# Patient Record
Sex: FEMALE | NOT HISPANIC OR LATINO
[De-identification: names, ages, dates, MRNs, and addresses within clinical notes are randomized per-mention and may not be internally consistent; named-entity substitution may affect disease eponyms.]

---

## 2017-10-02 ENCOUNTER — APPOINTMENT (OUTPATIENT)
Dept: OBGYN | Facility: CLINIC | Age: 52
End: 2017-10-02
Payer: COMMERCIAL

## 2017-10-02 VITALS
HEIGHT: 68 IN | WEIGHT: 180 LBS | SYSTOLIC BLOOD PRESSURE: 129 MMHG | DIASTOLIC BLOOD PRESSURE: 80 MMHG | BODY MASS INDEX: 27.28 KG/M2

## 2017-10-02 PROCEDURE — 82270 OCCULT BLOOD FECES: CPT

## 2017-10-02 PROCEDURE — 99396 PREV VISIT EST AGE 40-64: CPT

## 2017-10-04 ENCOUNTER — MESSAGE (OUTPATIENT)
Age: 52
End: 2017-10-04

## 2017-10-04 LAB — HPV HIGH+LOW RISK DNA PNL CVX: NOT DETECTED

## 2017-10-09 LAB — CYTOLOGY CVX/VAG DOC THIN PREP: NORMAL

## 2018-10-30 ENCOUNTER — OTHER (OUTPATIENT)
Age: 53
End: 2018-10-30

## 2018-11-07 ENCOUNTER — APPOINTMENT (OUTPATIENT)
Dept: OBGYN | Facility: CLINIC | Age: 53
End: 2018-11-07
Payer: COMMERCIAL

## 2018-11-07 VITALS
HEIGHT: 68 IN | SYSTOLIC BLOOD PRESSURE: 138 MMHG | WEIGHT: 182 LBS | BODY MASS INDEX: 27.58 KG/M2 | DIASTOLIC BLOOD PRESSURE: 88 MMHG

## 2018-11-07 PROCEDURE — 99396 PREV VISIT EST AGE 40-64: CPT

## 2018-11-09 ENCOUNTER — MESSAGE (OUTPATIENT)
Age: 53
End: 2018-11-09

## 2018-11-09 LAB — HPV HIGH+LOW RISK DNA PNL CVX: NOT DETECTED

## 2018-11-12 ENCOUNTER — MESSAGE (OUTPATIENT)
Age: 53
End: 2018-11-12

## 2018-11-12 LAB — CYTOLOGY CVX/VAG DOC THIN PREP: NORMAL

## 2019-10-21 ENCOUNTER — RX RENEWAL (OUTPATIENT)
Age: 54
End: 2019-10-21

## 2019-10-21 DIAGNOSIS — R92.2 INCONCLUSIVE MAMMOGRAM: ICD-10-CM

## 2019-11-18 ENCOUNTER — APPOINTMENT (OUTPATIENT)
Dept: OBGYN | Facility: CLINIC | Age: 54
End: 2019-11-18
Payer: COMMERCIAL

## 2019-11-18 VITALS
BODY MASS INDEX: 27.74 KG/M2 | WEIGHT: 183 LBS | HEIGHT: 68 IN | DIASTOLIC BLOOD PRESSURE: 84 MMHG | SYSTOLIC BLOOD PRESSURE: 138 MMHG

## 2019-11-18 PROCEDURE — 99396 PREV VISIT EST AGE 40-64: CPT

## 2019-11-20 ENCOUNTER — MESSAGE (OUTPATIENT)
Age: 54
End: 2019-11-20

## 2019-11-20 LAB — HPV HIGH+LOW RISK DNA PNL CVX: NOT DETECTED

## 2019-11-22 ENCOUNTER — MESSAGE (OUTPATIENT)
Age: 54
End: 2019-11-22

## 2019-11-22 LAB — CYTOLOGY CVX/VAG DOC THIN PREP: ABNORMAL

## 2020-08-28 ENCOUNTER — APPOINTMENT (OUTPATIENT)
Dept: OBGYN | Facility: CLINIC | Age: 55
End: 2020-08-28
Payer: COMMERCIAL

## 2020-08-28 VITALS
SYSTOLIC BLOOD PRESSURE: 130 MMHG | DIASTOLIC BLOOD PRESSURE: 84 MMHG | BODY MASS INDEX: 26.52 KG/M2 | WEIGHT: 175 LBS | HEIGHT: 68 IN

## 2020-08-28 DIAGNOSIS — N89.8 OTHER SPECIFIED NONINFLAMMATORY DISORDERS OF VAGINA: ICD-10-CM

## 2020-08-28 PROCEDURE — 99213 OFFICE O/P EST LOW 20 MIN: CPT

## 2020-08-28 NOTE — PHYSICAL EXAM
[Normal] : urethra [Labia Majora] : labia major [Labia Minora] : labia minora [FreeTextEntry4] : scant discharge Affirm done

## 2020-08-31 LAB
CANDIDA VAG CYTO: NOT DETECTED
G VAGINALIS+PREV SP MTYP VAG QL MICRO: NOT DETECTED
T VAGINALIS VAG QL WET PREP: NOT DETECTED

## 2020-12-21 ENCOUNTER — APPOINTMENT (OUTPATIENT)
Dept: OBGYN | Facility: CLINIC | Age: 55
End: 2020-12-21
Payer: COMMERCIAL

## 2020-12-21 VITALS
SYSTOLIC BLOOD PRESSURE: 123 MMHG | DIASTOLIC BLOOD PRESSURE: 80 MMHG | HEIGHT: 68 IN | BODY MASS INDEX: 24.55 KG/M2 | WEIGHT: 162 LBS

## 2020-12-21 PROCEDURE — 99396 PREV VISIT EST AGE 40-64: CPT

## 2020-12-21 PROCEDURE — 99072 ADDL SUPL MATRL&STAF TM PHE: CPT

## 2020-12-21 NOTE — HISTORY OF PRESENT ILLNESS
[FreeTextEntry1] : 54yo,  with gyn history significant for:\par 1.Fibroids\par 2.Dysmenorrhea\par LMP 2017

## 2020-12-21 NOTE — PLAN
[FreeTextEntry1] : 56yo without gyn complaint\par Plan:\par 1.PAP\par 2.Mammo\par 3.Colonosocpy within the year

## 2020-12-23 ENCOUNTER — MESSAGE (OUTPATIENT)
Age: 55
End: 2020-12-23

## 2020-12-23 ENCOUNTER — NON-APPOINTMENT (OUTPATIENT)
Age: 55
End: 2020-12-23

## 2020-12-23 LAB
CYTOLOGY CVX/VAG DOC THIN PREP: ABNORMAL
HPV HIGH+LOW RISK DNA PNL CVX: NOT DETECTED

## 2020-12-24 ENCOUNTER — TRANSCRIPTION ENCOUNTER (OUTPATIENT)
Age: 55
End: 2020-12-24

## 2021-04-18 ENCOUNTER — TRANSCRIPTION ENCOUNTER (OUTPATIENT)
Age: 56
End: 2021-04-18

## 2022-01-03 ENCOUNTER — APPOINTMENT (OUTPATIENT)
Dept: OBGYN | Facility: CLINIC | Age: 57
End: 2022-01-03
Payer: COMMERCIAL

## 2022-01-03 VITALS
WEIGHT: 177 LBS | DIASTOLIC BLOOD PRESSURE: 82 MMHG | SYSTOLIC BLOOD PRESSURE: 140 MMHG | HEIGHT: 68 IN | BODY MASS INDEX: 26.83 KG/M2

## 2022-01-03 PROCEDURE — 82270 OCCULT BLOOD FECES: CPT

## 2022-01-03 PROCEDURE — 99396 PREV VISIT EST AGE 40-64: CPT

## 2022-01-03 NOTE — DISCUSSION/SUMMARY
[FreeTextEntry1] : 55yo with vaginal dryness and dyspareunia\par Plan:\par 1.Replens;silicon based lubricant\par 2.PAP\par 3.Mammo\par 4.TVS\par 5.Colonosopcy

## 2022-01-03 NOTE — HISTORY OF PRESENT ILLNESS
[FreeTextEntry1] : 57yo,  with gyn history significant for:\par 1.Fibroids\par 2.Bladder ca:sees \par LMP 2017

## 2022-01-05 ENCOUNTER — MESSAGE (OUTPATIENT)
Age: 57
End: 2022-01-05

## 2022-01-05 LAB — HPV HIGH+LOW RISK DNA PNL CVX: NOT DETECTED

## 2022-01-07 ENCOUNTER — MESSAGE (OUTPATIENT)
Age: 57
End: 2022-01-07

## 2022-01-07 LAB — CYTOLOGY CVX/VAG DOC THIN PREP: ABNORMAL

## 2022-02-23 ENCOUNTER — NON-APPOINTMENT (OUTPATIENT)
Age: 57
End: 2022-02-23

## 2022-02-24 ENCOUNTER — NON-APPOINTMENT (OUTPATIENT)
Age: 57
End: 2022-02-24

## 2022-06-03 ENCOUNTER — APPOINTMENT (OUTPATIENT)
Dept: ORTHOPEDIC SURGERY | Facility: CLINIC | Age: 57
End: 2022-06-03

## 2022-10-05 ENCOUNTER — NON-APPOINTMENT (OUTPATIENT)
Age: 57
End: 2022-10-05

## 2023-01-11 ENCOUNTER — APPOINTMENT (OUTPATIENT)
Dept: OBGYN | Facility: CLINIC | Age: 58
End: 2023-01-11
Payer: COMMERCIAL

## 2023-01-11 VITALS
WEIGHT: 192.8 LBS | HEIGHT: 68 IN | BODY MASS INDEX: 29.22 KG/M2 | DIASTOLIC BLOOD PRESSURE: 84 MMHG | SYSTOLIC BLOOD PRESSURE: 126 MMHG

## 2023-01-11 VITALS — HEIGHT: 68 IN

## 2023-01-11 DIAGNOSIS — D25.9 LEIOMYOMA OF UTERUS, UNSPECIFIED: ICD-10-CM

## 2023-01-11 PROCEDURE — 99396 PREV VISIT EST AGE 40-64: CPT

## 2023-01-11 PROCEDURE — 82270 OCCULT BLOOD FECES: CPT

## 2023-01-11 NOTE — PLAN
[FreeTextEntry1] : Examination was done.  Patient was given prescription for mammogram bone density and transvaginal sonogram.\par Patient recommended to see dermatologist for mole on the left breast

## 2023-02-13 ENCOUNTER — APPOINTMENT (OUTPATIENT)
Dept: ORTHOPEDIC SURGERY | Facility: CLINIC | Age: 58
End: 2023-02-13
Payer: COMMERCIAL

## 2023-02-13 VITALS — WEIGHT: 192 LBS | HEIGHT: 68 IN | BODY MASS INDEX: 29.1 KG/M2

## 2023-02-13 DIAGNOSIS — G89.29 LOW BACK PAIN, UNSPECIFIED: ICD-10-CM

## 2023-02-13 DIAGNOSIS — M54.50 LOW BACK PAIN, UNSPECIFIED: ICD-10-CM

## 2023-02-13 PROCEDURE — 99214 OFFICE O/P EST MOD 30 MIN: CPT

## 2023-02-13 PROCEDURE — 73503 X-RAY EXAM HIP UNI 4/> VIEWS: CPT | Mod: RT

## 2023-02-13 NOTE — IMAGING
[de-identified] : Right hip: No swelling, no tenderness.  Loss of IR - 0.  Groin pain with IR.  Pos impingement.\par \par Left hip: Right hip: No swelling, no tenderness.  Mild loss of IR - 10.  Groin pain with IR.  Pos impingement.

## 2023-02-13 NOTE — HISTORY OF PRESENT ILLNESS
[Gradual] : gradual [6] : 6 [5] : 5 [Dull/Aching] : dull/aching [Tightness] : tightness [Constant] : constant [] : no [FreeTextEntry5] : pt is 57 years old female who present evaluation of the analisa hip pt states she thinks she has arthritis no  injury or trauma reported

## 2023-02-13 NOTE — DISCUSSION/SUMMARY
[de-identified] : The natural progression of Osteoarthritis was explained to the patient.  We discussed the possible treatment options from conservative to operative.  These included NSAIDS, Glucosamine and Chondrotin sulfate, and Physical Therapy as well different types of injections.  We also discussed that at some point they may progress to needed a GREG.  Information and pamphlets were given when approprate.\par \par We discussed hip injection and its diagnostic and therapeutic benefits.  In the operating room I will inject a combination of Depomedrol and Marcaine.  The patient will keep a log of their pain relief after the injection.  We will discuss the benefit at a one to two weeks follow-up.  Patient made aware that they may be referred to pain management for this injection\par

## 2023-02-13 NOTE — ASSESSMENT
[FreeTextEntry1] : Mod OA both hips - more in right than left with some progression seen.  DIscussed optiosn - will plan for right hip inj and PT for hips and back stiffness.

## 2023-03-08 ENCOUNTER — LABORATORY RESULT (OUTPATIENT)
Age: 58
End: 2023-03-08

## 2023-03-09 ENCOUNTER — APPOINTMENT (OUTPATIENT)
Age: 58
End: 2023-03-09
Payer: COMMERCIAL

## 2023-03-09 PROCEDURE — 27095 INJECTION FOR HIP X-RAY: CPT | Mod: RT

## 2023-03-09 PROCEDURE — 73525 CONTRAST X-RAY OF HIP: CPT | Mod: 26,59

## 2023-03-28 ENCOUNTER — TRANSCRIPTION ENCOUNTER (OUTPATIENT)
Age: 58
End: 2023-03-28

## 2023-03-28 ENCOUNTER — APPOINTMENT (OUTPATIENT)
Dept: ORTHOPEDIC SURGERY | Facility: CLINIC | Age: 58
End: 2023-03-28
Payer: COMMERCIAL

## 2023-03-28 VITALS — HEIGHT: 68 IN | WEIGHT: 192 LBS | BODY MASS INDEX: 29.1 KG/M2

## 2023-03-28 PROCEDURE — 99213 OFFICE O/P EST LOW 20 MIN: CPT

## 2023-03-28 NOTE — DISCUSSION/SUMMARY
[de-identified] : The natural progression of Osteoarthritis was explained to the patient.  We discussed the possible treatment options from conservative to operative.  These included NSAIDS, Glucosamine and Chondrotin sulfate, and Physical Therapy as well different types of injections.  We also discussed that at some point they may progress to needed a GREG.  Information and pamphlets were given when approprate.\par \par Entered by Shira Flanagan acting as scribe.

## 2023-03-28 NOTE — HISTORY OF PRESENT ILLNESS
[0] : 0 [de-identified] : 3/28/23: Significant improvement from right hip injection. At this point, she has minimal pain, only mild stiffness mostly during activity. She is going to PT with good relief. \par \par Prev doc:\par 2/13/23:  [FreeTextEntry1] : B/L Hip  [FreeTextEntry5] : Linda is a 57 year F here for B/L Hip. States there doing better. STates pt is helping and the injections.

## 2023-03-28 NOTE — ASSESSMENT
[FreeTextEntry1] : Mod OA both hips - more in right than left with some progression seen.  DIscussed optiosn - will plan for right hip inj and PT for hips and back stiffness.\par \par 3/28/23: Significant relief from right IA hip injection and PT. At this point, she was minimal pain. Advised to continue with HEP, anti-inflammatories as needed and maintain a healthy lifestyle with good nutrition and low impact exercises. Follow up prn.

## 2023-03-28 NOTE — IMAGING
[de-identified] : Right hip: No swelling, no tenderness.  Loss of IR - 0.  Groin pain with IR.  Pos impingement.\par \par Left hip: Right hip: No swelling, no tenderness.  Mild loss of IR - 10.  Groin pain with IR.  Pos impingement.

## 2023-06-02 ENCOUNTER — NON-APPOINTMENT (OUTPATIENT)
Age: 58
End: 2023-06-02

## 2023-06-05 ENCOUNTER — NON-APPOINTMENT (OUTPATIENT)
Age: 58
End: 2023-06-05

## 2023-12-30 ENCOUNTER — NON-APPOINTMENT (OUTPATIENT)
Age: 58
End: 2023-12-30

## 2024-03-21 ENCOUNTER — APPOINTMENT (OUTPATIENT)
Dept: OBGYN | Facility: CLINIC | Age: 59
End: 2024-03-21
Payer: COMMERCIAL

## 2024-03-21 VITALS
DIASTOLIC BLOOD PRESSURE: 88 MMHG | SYSTOLIC BLOOD PRESSURE: 134 MMHG | HEIGHT: 68 IN | BODY MASS INDEX: 28.64 KG/M2 | WEIGHT: 189 LBS

## 2024-03-21 DIAGNOSIS — Z01.419 ENCOUNTER FOR GYNECOLOGICAL EXAMINATION (GENERAL) (ROUTINE) W/OUT ABNORMAL FINDINGS: ICD-10-CM

## 2024-03-21 DIAGNOSIS — Z12.12 ENCOUNTER FOR SCREENING FOR MALIGNANT NEOPLASM OF COLON: ICD-10-CM

## 2024-03-21 DIAGNOSIS — Z12.11 ENCOUNTER FOR SCREENING FOR MALIGNANT NEOPLASM OF COLON: ICD-10-CM

## 2024-03-21 PROCEDURE — 99396 PREV VISIT EST AGE 40-64: CPT

## 2024-03-21 NOTE — PLAN
[FreeTextEntry1] : Examination Pap smear was done patient was given prescription for bone density mammogram sonogram, Patient was recommended to go for transvaginal sonogram.

## 2024-03-21 NOTE — HISTORY OF PRESENT ILLNESS
[Mammogramdate] : 2023 [FreeTextEntry1] : 58-year-old patient for gynecological exam without any gynecological complaints, Patient has history of fibroid uterus [PapSmeardate] : 2022 [ColonoscopyDate] : 2023 [Menopause Age: ____] : age at menopause was [unfilled] [Currently In Menopause] : currently in menopause

## 2024-03-21 NOTE — PHYSICAL EXAM
[Chaperone Declined] : Patient declined chaperone [Appropriately responsive] : appropriately responsive [Alert] : alert [No Acute Distress] : no acute distress [No Lymphadenopathy] : no lymphadenopathy [Regular Rate Rhythm] : regular rate rhythm [No Murmurs] : no murmurs [Clear to Auscultation B/L] : clear to auscultation bilaterally [Soft] : soft [Non-tender] : non-tender [Non-distended] : non-distended [No HSM] : No HSM [No Lesions] : no lesions [No Mass] : no mass [Examination Of The Breasts] : a normal appearance [Oriented x3] : oriented x3 [No Masses] : no breast masses were palpable [Labia Majora] : normal [Labia Minora] : normal [Uterine Adnexae] : normal [Normal] : normal

## 2024-04-01 LAB
CYTOLOGY CVX/VAG DOC THIN PREP: ABNORMAL
HPV HIGH+LOW RISK DNA PNL CVX: NOT DETECTED

## 2024-05-06 ENCOUNTER — NON-APPOINTMENT (OUTPATIENT)
Age: 59
End: 2024-05-06

## 2024-06-04 ENCOUNTER — APPOINTMENT (OUTPATIENT)
Dept: ORTHOPEDIC SURGERY | Facility: CLINIC | Age: 59
End: 2024-06-04
Payer: COMMERCIAL

## 2024-06-04 VITALS — WEIGHT: 189 LBS | HEIGHT: 68 IN | BODY MASS INDEX: 28.64 KG/M2

## 2024-06-04 DIAGNOSIS — M16.0 BILATERAL PRIMARY OSTEOARTHRITIS OF HIP: ICD-10-CM

## 2024-06-04 PROCEDURE — 73522 X-RAY EXAM HIPS BI 3-4 VIEWS: CPT

## 2024-06-04 PROCEDURE — 99214 OFFICE O/P EST MOD 30 MIN: CPT

## 2024-06-04 RX ORDER — MELOXICAM 15 MG/1
15 TABLET ORAL
Qty: 30 | Refills: 1 | Status: ACTIVE | COMMUNITY
Start: 2024-06-04 | End: 1900-01-01

## 2024-06-04 NOTE — ASSESSMENT
[FreeTextEntry1] : Mod OA both hips - more in right than left with some progression seen.  DIscussed optiosn - will plan for right hip inj and PT for hips and back stiffness. 3/28/23: Significant relief from right IA hip injection and PT. At this point, she was minimal pain. Advised to continue with HEP, anti-inflammatories as needed and maintain a healthy lifestyle with good nutrition and low impact exercises. Follow up prn.   6/4/24: Mild progression of bilateral mod hip OA. Right hip is more painful. Will plan for repeat right IA hip CSI with Dr. Polo. Will also have her start PT.

## 2024-06-04 NOTE — IMAGING
[de-identified] : Right hip: No swelling, no tenderness.  Loss of IR - 0.  Groin pain with IR.  Pos impingement.\par  \par  Left hip: Right hip: No swelling, no tenderness.  Mild loss of IR - 10.  Groin pain with IR.  Pos impingement.

## 2024-06-04 NOTE — HISTORY OF PRESENT ILLNESS
[Dull/Aching] : dull/aching [de-identified] : 6/4/24: f/up bilateral R>L hip pain. Had done very well with IA hip CSIs with Dr. Polo March 2023. Has been taking Tylenol and doing HEP. Symptoms started returning a few weeks ago.   Prev doc: 2/13/23:  3/28/23: Significant improvement from right hip injection. At this point, she has minimal pain, only mild stiffness mostly during activity. She is going to PT with good relief.  [FreeTextEntry5] : pain returned

## 2024-06-04 NOTE — DISCUSSION/SUMMARY
[de-identified] : The patient was advised of the diagnosis.  The natural history of the pathology was explained in full to the patient in layman's terms. All questions were answered.  The risks and benefits of surgical and non-surgical treatment alternatives were explained in full to the patient.  The natural progression of Osteoarthritis was explained to the patient.  We discussed the possible treatment options from conservative to operative.  These included NSAIDs, Glucosamine and Chondrotin sulfate, and Physical Therapy as well different types of injections.  We also discussed that at some point they may progress to needed a GREG.  Information and pamphlets were given when appropriate.  Progress note completed by Sindhu Swan PA-C, acting as scribe.

## 2024-06-21 ENCOUNTER — APPOINTMENT (OUTPATIENT)
Age: 59
End: 2024-06-21
Payer: COMMERCIAL

## 2024-06-21 PROCEDURE — 73525 CONTRAST X-RAY OF HIP: CPT | Mod: 26,59

## 2024-06-21 PROCEDURE — 27093 INJECTION FOR HIP X-RAY: CPT | Mod: RT

## 2024-07-23 ENCOUNTER — APPOINTMENT (OUTPATIENT)
Dept: ORTHOPEDIC SURGERY | Facility: CLINIC | Age: 59
End: 2024-07-23
Payer: COMMERCIAL

## 2024-07-23 ENCOUNTER — APPOINTMENT (OUTPATIENT)
Dept: ORTHOPEDIC SURGERY | Facility: CLINIC | Age: 59
End: 2024-07-23

## 2024-07-23 VITALS — HEIGHT: 68 IN | BODY MASS INDEX: 28.19 KG/M2 | WEIGHT: 186 LBS

## 2024-07-23 DIAGNOSIS — I10 ESSENTIAL (PRIMARY) HYPERTENSION: ICD-10-CM

## 2024-07-23 DIAGNOSIS — E78.00 PURE HYPERCHOLESTEROLEMIA, UNSPECIFIED: ICD-10-CM

## 2024-07-23 DIAGNOSIS — M16.0 BILATERAL PRIMARY OSTEOARTHRITIS OF HIP: ICD-10-CM

## 2024-07-23 PROCEDURE — 99214 OFFICE O/P EST MOD 30 MIN: CPT

## 2024-07-23 NOTE — ASSESSMENT
[FreeTextEntry1] : Mod OA both hips - more in right than left with some progression seen.  DIscussed optiosn - will plan for right hip inj and PT for hips and back stiffness. 3/28/23: Significant relief from right IA hip injection and PT. At this point, she was minimal pain. Advised to continue with HEP, anti-inflammatories as needed and maintain a healthy lifestyle with good nutrition and low impact exercises. Follow up prn.  6/4/24: Mild progression of bilateral mod hip OA. Right hip is more painful. Will plan for repeat right IA hip CSI with Dr. Polo. Will also have her start PT.   7/23 have still 7/10 pain and occ sharp pain -  mod/adv OA - we will try injection as did very well with the rt hip -  rt pain level 2/10.  Had good results will try inj on Lt and cont meds and start PT

## 2024-07-23 NOTE — DISCUSSION/SUMMARY
[de-identified] : The patient was advised of the diagnosis.  The natural history of the pathology was explained in full to the patient in layman's terms. All questions were answered.  The risks and benefits of surgical and non-surgical treatment alternatives were explained in full to the patient.  The natural progression of Osteoarthritis was explained to the patient.  We discussed the possible treatment options from conservative to operative.  These included NSAIDs, Glucosamine and Chondrotin sulfate, and Physical Therapy as well different types of injections.  We also discussed that at some point they may progress to needed a GREG.  Information and pamphlets were given when appropriate.  Progress note completed by Sindhu Swan PA-C, acting as scribe.

## 2024-07-23 NOTE — IMAGING
[de-identified] : Right hip: No swelling, no tenderness.  Loss of IR - 0.  Groin pain with IR.  Pos impingement.\par  \par  Left hip: Right hip: No swelling, no tenderness.  Mild loss of IR - 10.  Groin pain with IR.  Pos impingement.

## 2024-07-23 NOTE — HISTORY OF PRESENT ILLNESS
[2] : 2 [Dull/Aching] : dull/aching [de-identified] : 7/23  rt hip injection helped has less pain in rt hip -Lt hip more pain groin pain as bad anba rt hip was   Prev doc: 2/13/23:  3/28/23: Significant improvement from right hip injection. At this point, she has minimal pain, only mild stiffness mostly during activity. She is going to PT with good relief.  6/4/24: f/up bilateral R>L hip pain. Had done very well with IA hip CSIs with Dr. Polo March 2023. Has been taking Tylenol and doing HEP. Symptoms started returning a few weeks ago.

## 2024-08-16 ENCOUNTER — APPOINTMENT (OUTPATIENT)
Age: 59
End: 2024-08-16
Payer: COMMERCIAL

## 2024-08-16 PROCEDURE — 73525 CONTRAST X-RAY OF HIP: CPT | Mod: 26,59

## 2024-08-16 PROCEDURE — 27093 INJECTION FOR HIP X-RAY: CPT | Mod: LT

## 2024-09-04 ENCOUNTER — APPOINTMENT (OUTPATIENT)
Dept: PAIN MANAGEMENT | Facility: CLINIC | Age: 59
End: 2024-09-04
Payer: COMMERCIAL

## 2024-09-04 VITALS — BODY MASS INDEX: 28.49 KG/M2 | HEIGHT: 68 IN | WEIGHT: 188 LBS

## 2024-09-04 DIAGNOSIS — M16.0 BILATERAL PRIMARY OSTEOARTHRITIS OF HIP: ICD-10-CM

## 2024-09-04 PROCEDURE — 99214 OFFICE O/P EST MOD 30 MIN: CPT

## 2024-09-04 NOTE — HISTORY OF PRESENT ILLNESS
[2] : 2 [1] : 2 [Dull/Aching] : dull/aching [Sharp] : sharp [Shooting] : shooting [Tightness] : tightness [Intermittent] : intermittent [Household chores] : household chores [Social interactions] : social interactions [Heat] : heat [Physical therapy] : physical therapy [Standing] : standing [Walking] : walking [] : no [FreeTextEntry1] : LT HIP [FreeTextEntry7] : b/l legs  [FreeTextEntry9] : [employer]

## 2025-08-19 ENCOUNTER — APPOINTMENT (OUTPATIENT)
Dept: PAIN MANAGEMENT | Facility: CLINIC | Age: 60
End: 2025-08-19
Payer: COMMERCIAL

## 2025-08-19 VITALS — BODY MASS INDEX: 27.89 KG/M2 | HEIGHT: 68 IN | WEIGHT: 184 LBS

## 2025-08-19 DIAGNOSIS — M16.0 BILATERAL PRIMARY OSTEOARTHRITIS OF HIP: ICD-10-CM

## 2025-08-19 PROCEDURE — 99214 OFFICE O/P EST MOD 30 MIN: CPT

## 2025-09-05 ENCOUNTER — APPOINTMENT (OUTPATIENT)
Age: 60
End: 2025-09-05

## 2025-09-19 ENCOUNTER — APPOINTMENT (OUTPATIENT)
Age: 60
End: 2025-09-19